# Patient Record
Sex: FEMALE | Race: BLACK OR AFRICAN AMERICAN | NOT HISPANIC OR LATINO
[De-identification: names, ages, dates, MRNs, and addresses within clinical notes are randomized per-mention and may not be internally consistent; named-entity substitution may affect disease eponyms.]

---

## 2021-06-14 ENCOUNTER — APPOINTMENT (OUTPATIENT)
Dept: ORTHOPEDIC SURGERY | Facility: CLINIC | Age: 61
End: 2021-06-14
Payer: COMMERCIAL

## 2021-06-14 VITALS
BODY MASS INDEX: 29.7 KG/M2 | OXYGEN SATURATION: 98 % | DIASTOLIC BLOOD PRESSURE: 82 MMHG | SYSTOLIC BLOOD PRESSURE: 136 MMHG | HEART RATE: 72 BPM | WEIGHT: 196 LBS | HEIGHT: 68 IN

## 2021-06-14 DIAGNOSIS — S82.009A UNSPECIFIED FRACTURE OF UNSPECIFIED PATELLA, INITIAL ENCOUNTER FOR CLOSED FRACTURE: ICD-10-CM

## 2021-06-14 PROCEDURE — 73564 X-RAY EXAM KNEE 4 OR MORE: CPT | Mod: LT

## 2021-06-14 PROCEDURE — 73560 X-RAY EXAM OF KNEE 1 OR 2: CPT | Mod: RT

## 2021-06-14 PROCEDURE — 99072 ADDL SUPL MATRL&STAF TM PHE: CPT

## 2021-06-14 PROCEDURE — 20610 DRAIN/INJ JOINT/BURSA W/O US: CPT | Mod: LT

## 2021-06-14 PROCEDURE — 99204 OFFICE O/P NEW MOD 45 MIN: CPT | Mod: 25

## 2021-06-18 NOTE — PROCEDURE
[de-identified] : Left Knee aspiration\par Discussed at length the procedure of a knee aspiration. The risks, benefits, convalescence and alternatives were reviewed. The possible side effects discussed included but were not limited to: pain, swelling, bleeding and infection. Following this discussion, the knee was prepped with betadine and under a sterile condition, an 18 gauge needle was inserted into the joint through a lateral approach just superior to the patella with the knee in an extended position. The fluid was aspirated and sent for evaluation. Upon withdrawal of the needle the site was cleaned with alcohol and a bandaid applied. The patient tolerated the aspiration well and there were no adverse effects. Post aspiration instructions included no strenuous activity for 24 hours, cryotherapy and if there are any adverse effects to contact the office.\par \par 10 cc of normal appearing synovial fluid was aspirated.

## 2021-06-18 NOTE — ADDENDUM
[FreeTextEntry1] : Documented by Frankie Borjas acting as a scribe for Dr. Regan Del Cid on 06/14/2021. All medical record entries made by the Scribe were at my, Dr. Regan Del Cid, direction and personally dictated by me on 06/14/2021 . I have reviewed the chart and agree that the record accurately reflects my personal performance of the history, physical exam, assessment and plan. I have also personally directed, reviewed, and agreed with the chart.

## 2021-06-18 NOTE — PHYSICAL EXAM
[Poor Appearance] : well-appearing [Acute Distress] : not in acute distress [Obese] : not obese [de-identified] : Left knee: \par Inspection: trace effusion or erythema.\par Wounds: midline incision well healed. \par Alignment: normal\par Palpation: no specific tenderness on palpation.\par ROM: Active (in degrees) 0-130°\par Ligamentous laxity (neg):  left medial thrust, 12° of valgus, 10° extensor lag, medial lateral instability greater than 5 mm and AP translation greater than 10 mm\par Meniscal Test: negative McMurrays, negative Sofya.\par Patellofemoral Alignment Test: Q angle-, normal.\par Muscle Test: quad strength 4+/5\par Leg examination: calf is soft and non-tender. \par \par Right knee:\par Inspection: no effusion or erythema.\par Wounds: none.\par Alignment: normal\par Palpation: Tenderness over the patella. \par ROM: Active (in degrees) 0-130\par Ligamentous laxity (neg): all ligaments appear stable, negative ant. drawer test, negative post. drawer test, stable to varus stress test, stable to valgus stress test, negative Lachman's test, negative pivot shift test, less than 5 mm of medial and lateral laxity.  \par Meniscal Test: negative McMurrays, negative Sofya.\par Patellofemoral Alignment Test: Q angle-, normal.\par Muscle Test: good quad strength.\par Leg examination: calf is soft and non-tender.  [de-identified] : Left Knee Xrays, taken at the office today show: standing AP/Lateral, Merchant films, and 45 degree PA standing view demonstrates total knee replacement in satisfactory position and alignment. No evidence of lucency. Valgus alignment, femoral is flexed with reduced posterior conoidal offset and reduce tibial slope, facture of lateral patella facet, but patella appears to be well fixed in bone. \par \par Right knee xray merchant view taken at the office today demonstrates a tilt to the patella.

## 2021-06-18 NOTE — DISCUSSION/SUMMARY
[de-identified] : Discussed at length the nature of the patients condition. Their left knee knee symptoms appear secondary to global instability with valgus malalignment and patella fracture possibly a stress fracture, there is also a slight lag with extension. Discussed at length the nature of the failed total knee replacement and reviewed non-operative and operative treatment. Due to the pain and associated disability I recommend a LEFT revision total knee replacement. The risks, benefits, convalescence and alternatives were reviewed. Numerous questions were asked and answered. Models were used as an educational tool. Surgery will be scheduled at a convenient time.We did discuss implant choice and fixation, with shared decision making with the patient.LCCK Prosthesis should be available. The knee was aspirated to ensure there is no infection. I have ordered CBC, ESR, CRP, and D-DIMER as a baseline to rule out infection. Preop medical clearance.

## 2021-06-18 NOTE — HISTORY OF PRESENT ILLNESS
[de-identified] : 61 year old F presents for an initial evaluation of left knee pain, she has seen other knee providers and presents for a second opinion. S/p BL TKA done on 06- by Dr. Bravo. Right knee doing well, left knee continues to be painful. Pain is posterior and medial. Pain ranges from sharp to dull, there is swelling and buckling, no other mechanical issues. She feels as if the knee is going to give out in a valgus type fashion. No bracing at this time. Must climb stairs one at a time, she can not ambulate for long periods, both of these events cause severe pain. Patient has cervical spine pain and has injection with RFA treatment. Takes tramadol, Gabapentin, and Meloxicam for pain. No wound issues post operatively. She has presented to [facility] for this issue as well.  [Ataxia] : no ataxia [Incontinence] : no incontinence [Loss of Dexterity] : good dexterity [Urinary Ret.] : no urinary retention

## 2021-08-18 ENCOUNTER — NON-APPOINTMENT (OUTPATIENT)
Age: 61
End: 2021-08-18

## 2021-08-21 ENCOUNTER — LABORATORY RESULT (OUTPATIENT)
Age: 61
End: 2021-08-21

## 2021-08-21 RX ORDER — FOLIC ACID 0.8 MG
1 TABLET ORAL DAILY
Refills: 0 | Status: DISCONTINUED | OUTPATIENT
Start: 2021-08-24 | End: 2021-08-25

## 2021-08-21 RX ORDER — SODIUM CHLORIDE 9 MG/ML
1000 INJECTION, SOLUTION INTRAVENOUS
Refills: 0 | Status: DISCONTINUED | OUTPATIENT
Start: 2021-08-24 | End: 2021-08-25

## 2021-08-21 RX ORDER — ENOXAPARIN SODIUM 100 MG/ML
40 INJECTION SUBCUTANEOUS EVERY 24 HOURS
Refills: 0 | Status: DISCONTINUED | OUTPATIENT
Start: 2021-08-25 | End: 2021-08-25

## 2021-08-21 RX ORDER — OXYCODONE HYDROCHLORIDE 5 MG/1
5 TABLET ORAL
Refills: 0 | Status: DISCONTINUED | OUTPATIENT
Start: 2021-08-24 | End: 2021-08-25

## 2021-08-21 RX ORDER — POLYETHYLENE GLYCOL 3350 17 G/17G
17 POWDER, FOR SOLUTION ORAL AT BEDTIME
Refills: 0 | Status: DISCONTINUED | OUTPATIENT
Start: 2021-08-24 | End: 2021-08-25

## 2021-08-21 RX ORDER — OXYCODONE HYDROCHLORIDE 5 MG/1
10 TABLET ORAL
Refills: 0 | Status: DISCONTINUED | OUTPATIENT
Start: 2021-08-24 | End: 2021-08-25

## 2021-08-21 RX ORDER — ACETAMINOPHEN 500 MG
1000 TABLET ORAL ONCE
Refills: 0 | Status: DISCONTINUED | OUTPATIENT
Start: 2021-08-24 | End: 2021-08-25

## 2021-08-21 RX ORDER — MAGNESIUM HYDROXIDE 400 MG/1
30 TABLET, CHEWABLE ORAL DAILY
Refills: 0 | Status: DISCONTINUED | OUTPATIENT
Start: 2021-08-24 | End: 2021-08-25

## 2021-08-21 RX ORDER — TRAMADOL HYDROCHLORIDE 50 MG/1
50 TABLET ORAL EVERY 6 HOURS
Refills: 0 | Status: DISCONTINUED | OUTPATIENT
Start: 2021-08-24 | End: 2021-08-25

## 2021-08-21 RX ORDER — PANTOPRAZOLE SODIUM 20 MG/1
40 TABLET, DELAYED RELEASE ORAL
Refills: 0 | Status: DISCONTINUED | OUTPATIENT
Start: 2021-08-24 | End: 2021-08-25

## 2021-08-21 RX ORDER — ACETAMINOPHEN 500 MG
975 TABLET ORAL EVERY 8 HOURS
Refills: 0 | Status: DISCONTINUED | OUTPATIENT
Start: 2021-08-24 | End: 2021-08-25

## 2021-08-21 RX ORDER — SENNA PLUS 8.6 MG/1
2 TABLET ORAL AT BEDTIME
Refills: 0 | Status: DISCONTINUED | OUTPATIENT
Start: 2021-08-24 | End: 2021-08-25

## 2021-08-21 RX ORDER — HYDROMORPHONE HYDROCHLORIDE 2 MG/ML
0.5 INJECTION INTRAMUSCULAR; INTRAVENOUS; SUBCUTANEOUS ONCE
Refills: 0 | Status: DISCONTINUED | OUTPATIENT
Start: 2021-08-24 | End: 2021-08-25

## 2021-08-21 RX ORDER — FAMOTIDINE 10 MG/ML
20 INJECTION INTRAVENOUS EVERY 12 HOURS
Refills: 0 | Status: DISCONTINUED | OUTPATIENT
Start: 2021-08-24 | End: 2021-08-24

## 2021-08-23 RX ORDER — GABAPENTIN 400 MG/1
1 CAPSULE ORAL
Qty: 0 | Refills: 0 | DISCHARGE

## 2021-08-23 NOTE — PATIENT PROFILE ADULT - BILL PAYMENT
You are Important to us!  If you had tests done today, we promise to contact you with the results in 3-7 business days.    Some results take longer for us to get.  If you have not heard from our clinic by the 8th business day, please contact us at [phone].    If the test result is normal (OK), then one of our clinical caregivers will contact you by your preferred method.    If the test result is abnormal (!!!), then the Provider who ordered the test will contact you  
no

## 2021-08-23 NOTE — H&P ADULT - NSICDXPASTSURGICALHX_GEN_ALL_CORE_FT
PAST SURGICAL HISTORY:  H/O arthroscopy of knee bilateral      H/O total knee replacement     History of cholecystectomy

## 2021-08-23 NOTE — H&P ADULT - NSHPLABSRESULTS_GEN_ALL_CORE
Left knee x-rays demonstrates TKR in satisfactory position and alignment,  No evidence of lucency. Valgus alignment femoral is flexed with reduced posterior conoidal offset and reduce tibial slope, fracture of lateral patella facet, but patella appears  to be well fixed in bone.

## 2021-08-23 NOTE — H&P ADULT - HISTORY OF PRESENT ILLNESS
61 y.o. F. S/P Bilateral TKR done on 6/8/20 by Dr. Bravo. Right knee doing well but Left knee continues to be painful with swelling, buckling secondary to global instability. Patient is here today for her Left knee Revision TKR with Dr. Del Cid.

## 2021-08-24 ENCOUNTER — RESULT REVIEW (OUTPATIENT)
Age: 61
End: 2021-08-24

## 2021-08-24 ENCOUNTER — INPATIENT (INPATIENT)
Facility: HOSPITAL | Age: 61
LOS: 0 days | Discharge: HOME HEALTH | DRG: 468 | End: 2021-08-25
Attending: ORTHOPAEDIC SURGERY | Admitting: ORTHOPAEDIC SURGERY
Payer: COMMERCIAL

## 2021-08-24 ENCOUNTER — APPOINTMENT (OUTPATIENT)
Dept: ORTHOPEDIC SURGERY | Facility: HOSPITAL | Age: 61
End: 2021-08-24

## 2021-08-24 VITALS
DIASTOLIC BLOOD PRESSURE: 80 MMHG | WEIGHT: 173.28 LBS | HEIGHT: 64 IN | HEART RATE: 75 BPM | RESPIRATION RATE: 16 BRPM | TEMPERATURE: 98 F | OXYGEN SATURATION: 97 % | SYSTOLIC BLOOD PRESSURE: 148 MMHG

## 2021-08-24 DIAGNOSIS — Z96.659 PRESENCE OF UNSPECIFIED ARTIFICIAL KNEE JOINT: Chronic | ICD-10-CM

## 2021-08-24 DIAGNOSIS — Z96.652 PRESENCE OF LEFT ARTIFICIAL KNEE JOINT: ICD-10-CM

## 2021-08-24 DIAGNOSIS — Z98.890 OTHER SPECIFIED POSTPROCEDURAL STATES: Chronic | ICD-10-CM
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
## 2021-08-24 DIAGNOSIS — Z90.49 ACQUIRED ABSENCE OF OTHER SPECIFIED PARTS OF DIGESTIVE TRACT: Chronic | ICD-10-CM

## 2021-08-24 LAB
ANION GAP SERPL CALC-SCNC: 7 MMOL/L — SIGNIFICANT CHANGE UP (ref 5–17)
APTT BLD: 31.8 SEC — SIGNIFICANT CHANGE UP (ref 27.5–35.5)
BUN SERPL-MCNC: 20 MG/DL — SIGNIFICANT CHANGE UP (ref 7–23)
CALCIUM SERPL-MCNC: 9.1 MG/DL — SIGNIFICANT CHANGE UP (ref 8.4–10.5)
CHLORIDE SERPL-SCNC: 109 MMOL/L — HIGH (ref 96–108)
CO2 SERPL-SCNC: 25 MMOL/L — SIGNIFICANT CHANGE UP (ref 22–31)
CREAT SERPL-MCNC: 0.84 MG/DL — SIGNIFICANT CHANGE UP (ref 0.5–1.3)
GLUCOSE SERPL-MCNC: 97 MG/DL — SIGNIFICANT CHANGE UP (ref 70–99)
GRAM STN FLD: SIGNIFICANT CHANGE UP
HCT VFR BLD CALC: 37.4 % — SIGNIFICANT CHANGE UP (ref 34.5–45)
HGB BLD-MCNC: 11.8 G/DL — SIGNIFICANT CHANGE UP (ref 11.5–15.5)
INR BLD: 0.9 — SIGNIFICANT CHANGE UP (ref 0.88–1.16)
MCHC RBC-ENTMCNC: 26.6 PG — LOW (ref 27–34)
MCHC RBC-ENTMCNC: 31.6 GM/DL — LOW (ref 32–36)
MCV RBC AUTO: 84.4 FL — SIGNIFICANT CHANGE UP (ref 80–100)
NRBC # BLD: 0 /100 WBCS — SIGNIFICANT CHANGE UP (ref 0–0)
PLATELET # BLD AUTO: 290 K/UL — SIGNIFICANT CHANGE UP (ref 150–400)
POTASSIUM SERPL-MCNC: 4.5 MMOL/L — SIGNIFICANT CHANGE UP (ref 3.5–5.3)
POTASSIUM SERPL-SCNC: 4.5 MMOL/L — SIGNIFICANT CHANGE UP (ref 3.5–5.3)
PROTHROM AB SERPL-ACNC: 10.9 SEC — SIGNIFICANT CHANGE UP (ref 10.6–13.6)
RBC # BLD: 4.43 M/UL — SIGNIFICANT CHANGE UP (ref 3.8–5.2)
RBC # FLD: 15.6 % — HIGH (ref 10.3–14.5)
SODIUM SERPL-SCNC: 141 MMOL/L — SIGNIFICANT CHANGE UP (ref 135–145)
SPECIMEN SOURCE: SIGNIFICANT CHANGE UP
WBC # BLD: 6.03 K/UL — SIGNIFICANT CHANGE UP (ref 3.8–10.5)
WBC # FLD AUTO: 6.03 K/UL — SIGNIFICANT CHANGE UP (ref 3.8–10.5)

## 2021-08-24 PROCEDURE — 88305 TISSUE EXAM BY PATHOLOGIST: CPT | Mod: 26

## 2021-08-24 PROCEDURE — 88311 DECALCIFY TISSUE: CPT | Mod: 26

## 2021-08-24 PROCEDURE — 73560 X-RAY EXAM OF KNEE 1 OR 2: CPT | Mod: 26,LT

## 2021-08-24 PROCEDURE — 27487 REVISE/REPLACE KNEE JOINT: CPT | Mod: LT

## 2021-08-24 PROCEDURE — 88331 PATH CONSLTJ SURG 1 BLK 1SPC: CPT | Mod: 26

## 2021-08-24 PROCEDURE — 88300 SURGICAL PATH GROSS: CPT | Mod: 26,59

## 2021-08-24 RX ORDER — GABAPENTIN 400 MG/1
600 CAPSULE ORAL THREE TIMES A DAY
Refills: 0 | Status: DISCONTINUED | OUTPATIENT
Start: 2021-08-24 | End: 2021-08-25

## 2021-08-24 RX ORDER — MELOXICAM 15 MG/1
1 TABLET ORAL
Qty: 0 | Refills: 0 | DISCHARGE

## 2021-08-24 RX ORDER — HYDROMORPHONE HYDROCHLORIDE 2 MG/ML
0.5 INJECTION INTRAMUSCULAR; INTRAVENOUS; SUBCUTANEOUS
Refills: 0 | Status: COMPLETED | OUTPATIENT
Start: 2021-08-24 | End: 2021-08-24

## 2021-08-24 RX ORDER — GABAPENTIN 400 MG/1
1 CAPSULE ORAL
Qty: 0 | Refills: 0 | DISCHARGE

## 2021-08-24 RX ORDER — TIZANIDINE 4 MG/1
2 TABLET ORAL
Qty: 0 | Refills: 0 | DISCHARGE

## 2021-08-24 RX ORDER — CEFAZOLIN SODIUM 1 G
2000 VIAL (EA) INJECTION EVERY 8 HOURS
Refills: 0 | Status: COMPLETED | OUTPATIENT
Start: 2021-08-24 | End: 2021-08-25

## 2021-08-24 RX ORDER — BUPIVACAINE 13.3 MG/ML
20 INJECTION, SUSPENSION, LIPOSOMAL INFILTRATION ONCE
Refills: 0 | Status: DISCONTINUED | OUTPATIENT
Start: 2021-08-24 | End: 2021-08-25

## 2021-08-24 RX ADMIN — Medication 975 MILLIGRAM(S): at 21:28

## 2021-08-24 RX ADMIN — OXYCODONE HYDROCHLORIDE 10 MILLIGRAM(S): 5 TABLET ORAL at 16:53

## 2021-08-24 RX ADMIN — OXYCODONE HYDROCHLORIDE 10 MILLIGRAM(S): 5 TABLET ORAL at 20:06

## 2021-08-24 RX ADMIN — SODIUM CHLORIDE 70 MILLILITER(S): 9 INJECTION, SOLUTION INTRAVENOUS at 15:54

## 2021-08-24 RX ADMIN — OXYCODONE HYDROCHLORIDE 10 MILLIGRAM(S): 5 TABLET ORAL at 23:55

## 2021-08-24 RX ADMIN — OXYCODONE HYDROCHLORIDE 10 MILLIGRAM(S): 5 TABLET ORAL at 23:01

## 2021-08-24 RX ADMIN — GABAPENTIN 600 MILLIGRAM(S): 400 CAPSULE ORAL at 22:22

## 2021-08-24 RX ADMIN — Medication 975 MILLIGRAM(S): at 22:28

## 2021-08-24 RX ADMIN — HYDROMORPHONE HYDROCHLORIDE 0.5 MILLIGRAM(S): 2 INJECTION INTRAMUSCULAR; INTRAVENOUS; SUBCUTANEOUS at 15:32

## 2021-08-24 RX ADMIN — HYDROMORPHONE HYDROCHLORIDE 0.5 MILLIGRAM(S): 2 INJECTION INTRAMUSCULAR; INTRAVENOUS; SUBCUTANEOUS at 15:47

## 2021-08-24 RX ADMIN — Medication 100 MILLIGRAM(S): at 21:29

## 2021-08-24 RX ADMIN — POLYETHYLENE GLYCOL 3350 17 GRAM(S): 17 POWDER, FOR SOLUTION ORAL at 21:28

## 2021-08-24 RX ADMIN — OXYCODONE HYDROCHLORIDE 10 MILLIGRAM(S): 5 TABLET ORAL at 15:53

## 2021-08-24 RX ADMIN — SENNA PLUS 2 TABLET(S): 8.6 TABLET ORAL at 21:28

## 2021-08-24 RX ADMIN — OXYCODONE HYDROCHLORIDE 10 MILLIGRAM(S): 5 TABLET ORAL at 21:06

## 2021-08-24 NOTE — PACU DISCHARGE NOTE - COMMENTS
Patient currently stable. Pain managed with PRN pain medication. Passed trial of void (400cc). Tolerated PO intake. Patient OOB with PT and tolerated session well. Cleared by anesthesiologist to go to  jennifer ([phone]). report given to ADELSO Bonilla

## 2021-08-24 NOTE — CONSULT NOTE ADULT - SUBJECTIVE AND OBJECTIVE BOX
HPI:  61 year old female S/P Bilateral TKR done on 6/8/20 by Dr. Martin. Right knee doing well but Left knee continues to be moderately painful with swelling, buckling secondary to global instability and unsteady gait.  Symptoms worse with stairs and after prolonged immobility. Patient is here today for her Left knee Revision TKR with Dr. Del Cid.    PAST MEDICAL & SURGICAL HISTORY:  Denies DM HBP PUD ASTHMA  allergic rhinitis  H/O arthroscopy of knee bilateral  History of cholecystectomy  H/O total knee replacement    REVIEW OF SYSTEMS    General: normal  Skin/Breast: normal  Ophthalmologic: negative  ENMT:	normal  Respiratory and Thorax: normal  Cardiovascular:	normal  Gastrointestinal:	normal  Genitourinary:	normal  Musculoskeletal:	 left knee pain and swelling   Neurological:	normal  Psychiatric:	anxiety  Hematology/Lymphatics: negative  Endocrine:	negative  Allergic/Immunologic:	negative      MEDICATIONS     tiZANidine 2 mg oral capsule 2 cap(s) orally every 8 hours  · 	traMADol 50 mg oral tablet 1 tab(s) orally every 6 hours  · 	gabapentin 600 mg oral tablet 1 tab(s) orally 3 times a day    Allergies    No Known Drug Allergies  shellfish (Unknown)    SOCIAL HISTORY: no cigs social alcohol    FAMILY HISTORY: non contributory    PHYSICAL EXAM:  Daily Height in cm: 162.56 (24 Aug 2021 10:15)      Vital Signs Last 24 Hrs  T(C): 36.7 (24 Aug 2021 10:15), Max: 36.7 (24 Aug 2021 10:15)  T(F): 98 (24 Aug 2021 10:15), Max: 98 (24 Aug 2021 10:15)  HR: 75 (24 Aug 2021 10:15) (75 - 75)  BP: 148/80 (24 Aug 2021 10:15) (148/80 - 148/80)  BP(mean): --  RR: 16 (24 Aug 2021 10:15) (16 - 16)  SpO2: 97% (24 Aug 2021 10:15) (97% - 97%)    Constitutional: WDWNF in NAD  Eyes: conj pink  ENMT: negative  Neck: supple  Breasts: not examined   Back: negative  Respiratory: clear to P&A  Cardiovascular: no MRGT or H  Gastrointestinal: normal bowel sounds  Genitourinary: neg  Rectal: not examined  Extremities: normal  Vascular: normal  Neurological: normal  Skin: negative  Lymph Nodes: negative  Musculoskeletal:   instability  left knee TKR  Psychiatric: anxiety      LABS:                        11.8   6.03  )-----------( 290      ( 24 Aug 2021 10:45 )             37.4

## 2021-08-24 NOTE — PHYSICAL THERAPY INITIAL EVALUATION ADULT - ADDITIONAL COMMENTS
Pt lives with family in a private home with 9 steps to enter. Pt reports to use DME periodically post d/c from last sx.

## 2021-08-24 NOTE — BRIEF OPERATIVE NOTE - NSICDXBRIEFPROCEDURE_GEN_ALL_CORE_FT
PROCEDURES:  Revision procedure involving femoral and tibial components of knee arthroplasty 24-Aug-2021 11:48:54  Feliz Chapa

## 2021-08-25 ENCOUNTER — TRANSCRIPTION ENCOUNTER (OUTPATIENT)
Age: 61
End: 2021-08-25

## 2021-08-25 VITALS — SYSTOLIC BLOOD PRESSURE: 130 MMHG | OXYGEN SATURATION: 97 % | HEART RATE: 87 BPM | DIASTOLIC BLOOD PRESSURE: 79 MMHG

## 2021-08-25 LAB
ANION GAP SERPL CALC-SCNC: 8 MMOL/L — SIGNIFICANT CHANGE UP (ref 5–17)
BUN SERPL-MCNC: 15 MG/DL — SIGNIFICANT CHANGE UP (ref 7–23)
CALCIUM SERPL-MCNC: 9 MG/DL — SIGNIFICANT CHANGE UP (ref 8.4–10.5)
CHLORIDE SERPL-SCNC: 106 MMOL/L — SIGNIFICANT CHANGE UP (ref 96–108)
CO2 SERPL-SCNC: 26 MMOL/L — SIGNIFICANT CHANGE UP (ref 22–31)
CREAT SERPL-MCNC: 0.92 MG/DL — SIGNIFICANT CHANGE UP (ref 0.5–1.3)
GLUCOSE SERPL-MCNC: 104 MG/DL — HIGH (ref 70–99)
HCT VFR BLD CALC: 36.1 % — SIGNIFICANT CHANGE UP (ref 34.5–45)
HGB BLD-MCNC: 11.5 G/DL — SIGNIFICANT CHANGE UP (ref 11.5–15.5)
MCHC RBC-ENTMCNC: 27.3 PG — SIGNIFICANT CHANGE UP (ref 27–34)
MCHC RBC-ENTMCNC: 31.9 GM/DL — LOW (ref 32–36)
MCV RBC AUTO: 85.5 FL — SIGNIFICANT CHANGE UP (ref 80–100)
NRBC # BLD: 0 /100 WBCS — SIGNIFICANT CHANGE UP (ref 0–0)
PLATELET # BLD AUTO: 279 K/UL — SIGNIFICANT CHANGE UP (ref 150–400)
POTASSIUM SERPL-MCNC: 4.3 MMOL/L — SIGNIFICANT CHANGE UP (ref 3.5–5.3)
POTASSIUM SERPL-SCNC: 4.3 MMOL/L — SIGNIFICANT CHANGE UP (ref 3.5–5.3)
RBC # BLD: 4.22 M/UL — SIGNIFICANT CHANGE UP (ref 3.8–5.2)
RBC # FLD: 15.9 % — HIGH (ref 10.3–14.5)
SODIUM SERPL-SCNC: 140 MMOL/L — SIGNIFICANT CHANGE UP (ref 135–145)
WBC # BLD: 9.09 K/UL — SIGNIFICANT CHANGE UP (ref 3.8–10.5)
WBC # FLD AUTO: 9.09 K/UL — SIGNIFICANT CHANGE UP (ref 3.8–10.5)

## 2021-08-25 RX ORDER — KETOROLAC TROMETHAMINE 30 MG/ML
15 SYRINGE (ML) INJECTION ONCE
Refills: 0 | Status: DISCONTINUED | OUTPATIENT
Start: 2021-08-25 | End: 2021-08-25

## 2021-08-25 RX ORDER — ASPIRIN/CALCIUM CARB/MAGNESIUM 324 MG
1 TABLET ORAL
Qty: 56 | Refills: 0
Start: 2021-08-25 | End: 2021-09-21

## 2021-08-25 RX ORDER — OXYCODONE HYDROCHLORIDE 5 MG/1
1 TABLET ORAL
Qty: 42 | Refills: 0
Start: 2021-08-25 | End: 2021-08-31

## 2021-08-25 RX ORDER — TRAMADOL HYDROCHLORIDE 50 MG/1
1 TABLET ORAL
Qty: 0 | Refills: 0 | DISCHARGE

## 2021-08-25 RX ORDER — ENOXAPARIN SODIUM 100 MG/ML
40 INJECTION SUBCUTANEOUS
Qty: 13 | Refills: 0
Start: 2021-08-25 | End: 2021-09-06

## 2021-08-25 RX ADMIN — GABAPENTIN 600 MILLIGRAM(S): 400 CAPSULE ORAL at 13:19

## 2021-08-25 RX ADMIN — OXYCODONE HYDROCHLORIDE 10 MILLIGRAM(S): 5 TABLET ORAL at 13:20

## 2021-08-25 RX ADMIN — Medication 1 MILLIGRAM(S): at 11:43

## 2021-08-25 RX ADMIN — Medication 975 MILLIGRAM(S): at 13:19

## 2021-08-25 RX ADMIN — ENOXAPARIN SODIUM 40 MILLIGRAM(S): 100 INJECTION SUBCUTANEOUS at 07:34

## 2021-08-25 RX ADMIN — Medication 975 MILLIGRAM(S): at 14:19

## 2021-08-25 RX ADMIN — Medication 975 MILLIGRAM(S): at 07:14

## 2021-08-25 RX ADMIN — Medication 1 TABLET(S): at 11:43

## 2021-08-25 RX ADMIN — Medication 15 MILLIGRAM(S): at 14:35

## 2021-08-25 RX ADMIN — TRAMADOL HYDROCHLORIDE 50 MILLIGRAM(S): 50 TABLET ORAL at 11:43

## 2021-08-25 RX ADMIN — Medication 100 MILLIGRAM(S): at 06:13

## 2021-08-25 RX ADMIN — OXYCODONE HYDROCHLORIDE 10 MILLIGRAM(S): 5 TABLET ORAL at 14:20

## 2021-08-25 RX ADMIN — GABAPENTIN 600 MILLIGRAM(S): 400 CAPSULE ORAL at 06:13

## 2021-08-25 RX ADMIN — OXYCODONE HYDROCHLORIDE 10 MILLIGRAM(S): 5 TABLET ORAL at 10:17

## 2021-08-25 RX ADMIN — Medication 15 MILLIGRAM(S): at 13:35

## 2021-08-25 RX ADMIN — OXYCODONE HYDROCHLORIDE 10 MILLIGRAM(S): 5 TABLET ORAL at 07:14

## 2021-08-25 RX ADMIN — PANTOPRAZOLE SODIUM 40 MILLIGRAM(S): 20 TABLET, DELAYED RELEASE ORAL at 06:13

## 2021-08-25 RX ADMIN — TRAMADOL HYDROCHLORIDE 50 MILLIGRAM(S): 50 TABLET ORAL at 12:43

## 2021-08-25 RX ADMIN — OXYCODONE HYDROCHLORIDE 10 MILLIGRAM(S): 5 TABLET ORAL at 06:14

## 2021-08-25 RX ADMIN — OXYCODONE HYDROCHLORIDE 10 MILLIGRAM(S): 5 TABLET ORAL at 11:17

## 2021-08-25 RX ADMIN — Medication 975 MILLIGRAM(S): at 06:13

## 2021-08-25 NOTE — DISCHARGE NOTE PROVIDER - NSDCCPTREATMENT_GEN_ALL_CORE_FT
PRINCIPAL PROCEDURE  Procedure: Revision procedure involving femoral and tibial components of knee arthroplasty  Findings and Treatment: left knee pain h/o TKA

## 2021-08-25 NOTE — DISCHARGE NOTE PROVIDER - CARE PROVIDER_API CALL
Regan Del Cid)  Orthopaedic Surgery  [address] [place] [address]  Phone: [phone]  Fax: [phone]  Follow Up Time: 2 weeks

## 2021-08-25 NOTE — DISCHARGE NOTE NURSING/CASE MANAGEMENT/SOCIAL WORK - PATIENT PORTAL LINK FT
You can access the FollowMyHealth Patient Portal offered by [facility] by registering at the following website: [email or web address]. By joining [employer]’s FollowMyHealth portal, you will also be able to view your health information using other applications (apps) compatible with our system.

## 2021-08-25 NOTE — DISCHARGE NOTE PROVIDER - NSDCMRMEDTOKEN_GEN_ALL_CORE_FT
gabapentin 600 mg oral tablet: 1 tab(s) orally 3 times a day  meloxicam 5 mg oral capsule: 1 cap(s) orally once a day  tiZANidine 2 mg oral capsule: 2 cap(s) orally every 8 hours  traMADol 50 mg oral tablet: 1 tab(s) orally every 6 hours   Aspirin Enteric Coated 81 mg oral delayed release tablet: 1 tab(s) orally 2 times a day for 4 weeks  START AFTER COMPLETION OF ENOXAPARIN INJECTIONS  enoxaparin 40 mg/0.4 mL injectable solution: 40 milligram(s) injectable once a day for 13 days  gabapentin 600 mg oral tablet: 1 tab(s) orally 3 times a day  meloxicam 5 mg oral capsule: 1 cap(s) orally once a day  oxyCODONE 5 mg oral tablet: 1-2 tab(s) orally every 4-6 hours, as needed for moderate to severe pain MDD:6  tiZANidine 2 mg oral capsule: 2 cap(s) orally every 8 hours

## 2021-08-25 NOTE — OCCUPATIONAL THERAPY INITIAL EVALUATION ADULT - ADDITIONAL COMMENTS
Pt lives w/ her family in private house w/ ~10 stairs to negotiate. Pt states that she was independent in her ADLs and functional mobility prior to admission. Pt has a commode and shower stool.

## 2021-08-25 NOTE — PROGRESS NOTE ADULT - SUBJECTIVE AND OBJECTIVE BOX
SUBJECTIVE: Pt seen and examined on morning rounds. Pt feeling well without major complaints. Pain controlled. Denies any numbness/tingling.    Vital Signs Last 24 Hrs  T(C): 36.8 (25 Aug 2021 04:47), Max: 37.1 (24 Aug 2021 17:27)  T(F): 98.3 (25 Aug 2021 04:47), Max: 98.8 (24 Aug 2021 20:21)  HR: 85 (25 Aug 2021 04:47) (68 - 92)  BP: 130/71 (25 Aug 2021 04:47) (106/64 - 148/80)  BP(mean): 104 (24 Aug 2021 17:00) (78 - 104)  RR: 15 (25 Aug 2021 04:47) (12 - 38)  SpO2: 97% (25 Aug 2021 04:47) (97% - 100%)    Physical Exam:  General: NAD, resting comfortably in bed  ACE/Aquacel dressing clean  HVx1 holding suction  Motor 5/5 Quad/Psoas/TA/GS/EHL  SILT grossly SPN/DPN/Saph/Colette/Tib    Assessment/Plan:  61yF s/p R TKA by Dr. RONNIE Del Cid on 08-24   - Weight Bearing Status: WBAT  - Pain control  - DVT PPx: LVX  - PT rec: Home PT pending clearance  -  10/60  - F/u AM labs    Jennyfer Begum, PGY-2  Orthopedic Surgery     SUBJECTIVE: Pt seen and examined on morning rounds. Pt feeling well without major complaints. Pain controlled. Denies any numbness/tingling.    Vital Signs Last 24 Hrs  T(C): 36.8 (25 Aug 2021 04:47), Max: 37.1 (24 Aug 2021 17:27)  T(F): 98.3 (25 Aug 2021 04:47), Max: 98.8 (24 Aug 2021 20:21)  HR: 85 (25 Aug 2021 04:47) (68 - 92)  BP: 130/71 (25 Aug 2021 04:47) (106/64 - 148/80)  BP(mean): 104 (24 Aug 2021 17:00) (78 - 104)  RR: 15 (25 Aug 2021 04:47) (12 - 38)  SpO2: 97% (25 Aug 2021 04:47) (97% - 100%)    Physical Exam:  General: NAD, resting comfortably in bed  ACE/Aquacel dressing clean  HVx1 holding suction  Motor 5/5 Quad/Psoas/TA/GS/EHL  SILT grossly SPN/DPN/Saph/Colette/Tib    Assessment/Plan:  61yF s/p L Rev TKA by Dr. RONNIE Del Cid on 08-24   - Weight Bearing Status: WBAT  - Pain control  - DVT PPx: LVX  - PT rec: Home PT pending clearance  -  10/60  - F/u AM labs    Jennyfer Begum, PGY-2  Orthopedic Surgery

## 2021-08-25 NOTE — DISCHARGE NOTE PROVIDER - HOSPITAL COURSE
Admitted 8/24/21  Surgery left knee revision TKA  Daisha-op Antibiotics  Pain control  DVT prophylaxis  OOB/Physical Therapy

## 2021-08-25 NOTE — PROGRESS NOTE ADULT - TIME BILLING
discussed with nursing staff house staff and Dr. Del Cid.  Recheck surgical cultures.  Plan for discharge to home with PT

## 2021-08-25 NOTE — PROGRESS NOTE ADULT - SUBJECTIVE AND OBJECTIVE BOX
pt doing well, denies n/t. ambulated w PT yesterday    LLE  drsg intact, HV w sanguinous dc  SILT DP/SP/T/S/S  +FHL/EHL/DF/PF    s/p L rev TKA  WBAT RLE  Finish ancef  DVT ppx - on Lov 40 QD  PT  DC likely today

## 2021-08-25 NOTE — DISCHARGE NOTE PROVIDER - NSDCFUADDINST_GEN_ALL_CORE_FT
No strenuous activity, heavy lifting, driving or returning to work until cleared by MD.  You may take showers. Keep the battery pack dry. You may disconnect the dressing from the battery pack prior to showers and keep away from water. To do this, hold the on/off button down until it turns off, close the clamp on the tubing, then disconnect the tubing from the battery pack. Do the reverse to turn it back on.  No soaking in bathtubs.  The dressing has a battery that usually dies in 7 days. Once this occurs, you may remove the dressing and dispose of it, then leave incision open to air. Keep incision clean and dry.  Any staples/sutures should be removed in 10-14 days.  Try to have regular bowel movements, take stool softener or laxative if necessary.  May take Aleve or Naproxen instead of Celebrex.  Swelling may travel all the way down leg to foot, this is normal and will subside in a few weeks.  Call to schedule an appt with Dr. Del Cid for follow up after discharge, usually 2-3 weeks postop.  Contact doctor if you experience: fever greater than 101.5, chills, chest pain, difficulty breathing, redness or excessive drainage around the incision, other concerns.  Follow up with primary care provider.   ***You are on blood thinners to prevent blood clots.  Administer one enoxaparin (lovenox) 40mg injection daily for fourteen days after surgery (13 more days after discharge).  After completion of the enoxaparin (lovenox) injections, start ecotrin 81mg (enteric coated aspirin therapy) two times daily for an additional 4 weeks.***  Weight bearing as tolerated with rolling walker  No strenuous activity, heavy lifting, driving or returning to work until cleared by MD.    You have a prevena dressing.  You may take showers. Keep the battery pack dry.   No soaking in bathtubs.  This dressing may be removed 5 days after surgery. To do this, hold the button down on the battery pack to turn the suction cannister off. Then peel off the dressing. Replace dressing with aquacel dressing given to you at time of discharge, and keep onuntil your follow-up with Dr. Del Cid.     Try to have regular bowel movements, take stool softener or laxative if necessary.  May take Pepcid or Zantac for upset stomach.  Swelling may travel all the way down leg to foot, this is normal and will subside in a few weeks.  Call to schedule an appt with Dr. Del Cid for follow up, if you have staples or sutures they will be removed in office.  Contact your doctor if you experience: fever greater than 101.5, chills, chest pain, difficulty breathing, redness or excessive drainage around the incision, other concerns.  Follow up with your primary care provider.

## 2021-08-25 NOTE — OCCUPATIONAL THERAPY INITIAL EVALUATION ADULT - MD ORDER
61 y.o. F. S/P Bilateral TKR done on 6/8/20. Pt's right knee is doing well but Left knee continues to be painful with swelling, buckling secondary to global instability. Patient is admitted to [facility] for her Left knee Revision TKR

## 2021-08-25 NOTE — DISCHARGE NOTE PROVIDER - NSDCCPCAREPLAN_GEN_ALL_CORE_FT
PRINCIPAL DISCHARGE DIAGNOSIS  Diagnosis: H/O total knee replacement, left  Assessment and Plan of Treatment: left knee revision TKA      SECONDARY DISCHARGE DIAGNOSES  Diagnosis: Allergic rhinitis  Assessment and Plan of Treatment:     Diagnosis: Anxiety  Assessment and Plan of Treatment:

## 2021-08-25 NOTE — PROGRESS NOTE ADULT - REASON FOR ADMISSION
Left knee painful TKR for left knee Revision TKR

## 2021-08-25 NOTE — DISCHARGE NOTE NURSING/CASE MANAGEMENT/SOCIAL WORK - NSDCPEFALRISK_GEN_ALL_CORE
For information on Fall & injury Prevention, visit [email or web address]

## 2021-08-27 LAB — SURGICAL PATHOLOGY STUDY: SIGNIFICANT CHANGE UP

## 2021-08-29 LAB
CULTURE RESULTS: NO GROWTH — SIGNIFICANT CHANGE UP
SPECIMEN SOURCE: SIGNIFICANT CHANGE UP

## 2021-08-30 DIAGNOSIS — R26.81 UNSTEADINESS ON FEET: ICD-10-CM
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
## 2021-08-30 DIAGNOSIS — Z96.653 PRESENCE OF ARTIFICIAL KNEE JOINT, BILATERAL: ICD-10-CM

## 2021-08-30 DIAGNOSIS — J30.9 ALLERGIC RHINITIS, UNSPECIFIED: ICD-10-CM

## 2021-08-30 DIAGNOSIS — T84.093A OTHER MECHANICAL COMPLICATION OF INTERNAL LEFT KNEE PROSTHESIS, INITIAL ENCOUNTER: ICD-10-CM

## 2021-08-30 DIAGNOSIS — T84.84XA PAIN DUE TO INTERNAL ORTHOPEDIC PROSTHETIC DEVICES, IMPLANTS AND GRAFTS, INITIAL ENCOUNTER: ICD-10-CM

## 2021-08-30 DIAGNOSIS — S82.002S UNSPECIFIED FRACTURE OF LEFT PATELLA, SEQUELA: ICD-10-CM

## 2021-08-30 DIAGNOSIS — M25.562 PAIN IN LEFT KNEE: ICD-10-CM
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
## 2021-08-30 DIAGNOSIS — F41.9 ANXIETY DISORDER, UNSPECIFIED: ICD-10-CM

## 2021-08-31 ENCOUNTER — NON-APPOINTMENT (OUTPATIENT)
Age: 61
End: 2021-08-31

## 2021-09-02 RX ORDER — OXYCODONE 5 MG/1
5 TABLET ORAL
Qty: 40 | Refills: 0 | Status: ACTIVE | COMMUNITY
Start: 2021-09-02 | End: 1900-01-01

## 2021-09-13 ENCOUNTER — APPOINTMENT (OUTPATIENT)
Dept: ORTHOPEDIC SURGERY | Facility: CLINIC | Age: 61
End: 2021-09-13
Payer: COMMERCIAL

## 2021-09-13 VITALS
WEIGHT: 200 LBS | HEART RATE: 62 BPM | HEIGHT: 64 IN | DIASTOLIC BLOOD PRESSURE: 82 MMHG | BODY MASS INDEX: 34.15 KG/M2 | SYSTOLIC BLOOD PRESSURE: 126 MMHG

## 2021-09-13 VITALS — HEIGHT: 68 IN | BODY MASS INDEX: 29.7 KG/M2 | WEIGHT: 196 LBS

## 2021-09-13 PROBLEM — J30.9 ALLERGIC RHINITIS, UNSPECIFIED: Chronic | Status: ACTIVE | Noted: 2021-08-24

## 2021-09-13 PROBLEM — F41.9 ANXIETY DISORDER, UNSPECIFIED: Chronic | Status: ACTIVE | Noted: 2021-08-24

## 2021-09-13 PROCEDURE — 99024 POSTOP FOLLOW-UP VISIT: CPT

## 2021-09-13 RX ORDER — OXYCODONE 5 MG/1
5 TABLET ORAL
Qty: 40 | Refills: 0 | Status: ACTIVE | COMMUNITY
Start: 2021-09-13 | End: 1900-01-01

## 2021-09-13 NOTE — HISTORY OF PRESENT ILLNESS
[de-identified] : Post-op visit [de-identified] : Patient presents today for the F/U S/P left Revision TKR done 3 weeks ago. Patient is doing well and undergoing P.T. 3 times a week with improvement. Takes Oxycodone for pain meds and Aspirin for DVT prophylaxis.  [de-identified] : ROM 0-90, straight leg raise [de-identified] : Continue P.T. pain management and DVT prophylaxis. F/U in 1 month with x-rays.\par

## 2021-09-13 NOTE — PROCEDURE
[de-identified] : Staples completely removed. Observation on incision dry, clean, intact, well healed. Method staple removing kit. Suture site Cleaned with iodine swab after sutures are completely removed. Instructions Keep incision dry and clean, allowed to shower and pat site dry, do not rub dry, contact office is site becomes red, swollen, infected, or you develop a fever. \par \par

## 2021-09-14 LAB
CULTURE RESULTS: NO GROWTH — SIGNIFICANT CHANGE UP
CULTURE RESULTS: NO GROWTH — SIGNIFICANT CHANGE UP
SPECIMEN SOURCE: SIGNIFICANT CHANGE UP
SPECIMEN SOURCE: SIGNIFICANT CHANGE UP

## 2021-09-17 PROCEDURE — 73560 X-RAY EXAM OF KNEE 1 OR 2: CPT

## 2021-09-17 PROCEDURE — 85730 THROMBOPLASTIN TIME PARTIAL: CPT

## 2021-09-17 PROCEDURE — 88331 PATH CONSLTJ SURG 1 BLK 1SPC: CPT

## 2021-09-17 PROCEDURE — 97535 SELF CARE MNGMENT TRAINING: CPT

## 2021-09-17 PROCEDURE — 87075 CULTR BACTERIA EXCEPT BLOOD: CPT

## 2021-09-17 PROCEDURE — 80048 BASIC METABOLIC PNL TOTAL CA: CPT

## 2021-09-17 PROCEDURE — 97161 PT EVAL LOW COMPLEX 20 MIN: CPT

## 2021-09-17 PROCEDURE — C1776: CPT

## 2021-09-17 PROCEDURE — 85027 COMPLETE CBC AUTOMATED: CPT

## 2021-09-17 PROCEDURE — 88311 DECALCIFY TISSUE: CPT

## 2021-09-17 PROCEDURE — 97116 GAIT TRAINING THERAPY: CPT

## 2021-09-17 PROCEDURE — 88300 SURGICAL PATH GROSS: CPT

## 2021-09-17 PROCEDURE — 36415 COLL VENOUS BLD VENIPUNCTURE: CPT

## 2021-09-17 PROCEDURE — C1713: CPT

## 2021-09-17 PROCEDURE — 88305 TISSUE EXAM BY PATHOLOGIST: CPT

## 2021-09-17 PROCEDURE — 85610 PROTHROMBIN TIME: CPT

## 2021-09-17 PROCEDURE — 87070 CULTURE OTHR SPECIMN AEROBIC: CPT

## 2021-10-04 ENCOUNTER — APPOINTMENT (OUTPATIENT)
Dept: ORTHOPEDIC SURGERY | Facility: CLINIC | Age: 61
End: 2021-10-04
Payer: COMMERCIAL

## 2021-10-04 DIAGNOSIS — Z47.1 AFTERCARE FOLLOWING JOINT REPLACEMENT SURGERY: ICD-10-CM

## 2021-10-04 DIAGNOSIS — Z96.652 AFTERCARE FOLLOWING JOINT REPLACEMENT SURGERY: ICD-10-CM

## 2021-10-04 DIAGNOSIS — Z96.659 OTHER MECHANICAL COMPLICATION OF OTHER INTERNAL JOINT PROSTHESIS, INITIAL ENCOUNTER: ICD-10-CM

## 2021-10-04 DIAGNOSIS — T84.098A OTHER MECHANICAL COMPLICATION OF OTHER INTERNAL JOINT PROSTHESIS, INITIAL ENCOUNTER: ICD-10-CM

## 2021-10-04 PROCEDURE — 99024 POSTOP FOLLOW-UP VISIT: CPT

## 2021-10-04 PROCEDURE — 73562 X-RAY EXAM OF KNEE 3: CPT | Mod: LT

## 2021-10-04 RX ORDER — TRAMADOL HYDROCHLORIDE 50 MG/1
50 TABLET, COATED ORAL
Qty: 40 | Refills: 0 | Status: ACTIVE | COMMUNITY
Start: 2021-10-04 | End: 1900-01-01

## 2021-10-13 NOTE — DISCUSSION/SUMMARY
[de-identified] : Overall, the patient is making good progress. She should continue with physical therapy. They can continue activities as tolerated. See back in 6-8 weeks.

## 2021-10-13 NOTE — PHYSICAL EXAM
[de-identified] : LEFT Knee\par Inspection: no effusion\par Wounds: healed midline incision.\par Alignment: normal.\par Palpation: no specific tenderness on palpation. Some soft tissue swelling.\par ROM: Active (in degrees): 0-120, no instability.\par Ligamentous laxity (neg): negative ant. drawer test, negative post. drawer test, stable to varus stress test, stable to valgus stress test.\par Patellofemoral Alignment Test: Q angle-, normal.\par Muscle Test: good quad strength.\par Leg examination: calf is soft and non-tender. [de-identified] : Left knee radiographs 3 views taken in the office today AP Lateral and Merchant views revision TKA in good position and alignment, chronic patella fracture with no change form prior views\par \par Right knee Merchant view radiograph taken ion the office today show TKA with patella in a central position

## 2021-10-13 NOTE — HISTORY OF PRESENT ILLNESS
[de-identified] : 61 year old female presents for 6-week follow-up evaluation of s/p left revisional TKR. The patient is doing well overall. She is currently doing physical therapy and would like a prescription to continue. She is taking Tramadol for her symptoms, especially for therapy, and would like a refill. Overall, she is happy with the progress she has made. For swelling, she should ice and elevate.\par \par

## 2021-10-13 NOTE — END OF VISIT
[FreeTextEntry3] : This note was written by Carolyn Pérez on 10/4/2021 acting as scribe for Dr. Regan Del Cid M.D.\par \par I, Dr. Regan Del Cid, have read and attest that all the information, medical decision making and discharge instructions within are true and accurate.

## 2021-10-13 NOTE — REVIEW OF SYSTEMS
[Joint Pain] : joint pain [Negative] : Heme/Lymph [Arthralgia] : no arthralgia [Joint Stiffness] : no joint stiffness

## 2021-11-08 ENCOUNTER — APPOINTMENT (OUTPATIENT)
Dept: ORTHOPEDIC SURGERY | Facility: CLINIC | Age: 61
End: 2021-11-08
Payer: COMMERCIAL

## 2021-11-08 PROCEDURE — 73562 X-RAY EXAM OF KNEE 3: CPT | Mod: LT

## 2021-11-08 PROCEDURE — 99024 POSTOP FOLLOW-UP VISIT: CPT

## 2021-11-08 NOTE — DISCUSSION/SUMMARY
[de-identified] : Patient is doing well following her left revision TKR done 3 months ago. She is making good progress with her physical therapist. We will renew her prescription for PT so that she can continue. See back in 3 months with x-ray. Refer to Dr. Malik for spinal evaluation.

## 2021-11-08 NOTE — HISTORY OF PRESENT ILLNESS
[de-identified] : 61 year old female presents for follow-up evaluation s/p left revision TKR done about 3 months ago. Overall, the patient is doing well and has been going to PT. She is taking Tramadol for her back pain, which was given to her by her spinal specialist. She would like to get a referral for a new spine specialist today from Dr. Del Cid if possible, as well as another prescription for PT.

## 2021-11-08 NOTE — REASON FOR VISIT
[Post Operative Visit] : a post operative visit for [Artificial Knee Joint] : artificial knee joint [Knee Pain] : knee pain

## 2021-11-08 NOTE — END OF VISIT
[FreeTextEntry3] : This note was written by Carolyn Pérez on 11/8/2021 acting as scribe for Dr. Regan Del Cid M.D.\par \par I, Dr. Regan Del Cid, have read and attest that all the information, medical decision making and discharge instructions within are true and accurate.

## 2021-11-08 NOTE — PHYSICAL EXAM
[de-identified] : LEFT Knee\par Inspection: no effusion or erythema. Minimal soft tissue swelling.\par Wounds: healed midline incision.\par Alignment: normal.\par Palpation: no specific tenderness on palpation.\par ROM: Active (in degrees): 0-125 degrees. \par Ligamentous laxity (neg): all ligaments appear stable, negative ant. drawer test, negative post. drawer test, stable to varus stress test, stable to valgus stress test, negative Lachman's test, negative pivot shift test.\par Meniscal test: negative Dianelys's, negative Sofya.\par Patellofemoral Alignment Test: Q-angle-, normal.\par Muscle Test: good quad strength.\par Leg examination: calf is soft and non-tender. [de-identified] : Left knee radiographs 3 views taken in the office today AP Lateral and Merchant views revision TKR in good position and alignment, chronic patella fracture with no change form prior views\par \par Right knee Merchant view radiograph taken in the office today show TKR with patella in a central position. \par

## 2022-07-19 ENCOUNTER — NON-APPOINTMENT (OUTPATIENT)
Age: 62
End: 2022-07-19

## 2022-07-20 ENCOUNTER — APPOINTMENT (OUTPATIENT)
Dept: RHEUMATOLOGY | Facility: CLINIC | Age: 62
End: 2022-07-20

## 2022-07-20 VITALS
DIASTOLIC BLOOD PRESSURE: 75 MMHG | TEMPERATURE: 98.6 F | HEART RATE: 89 BPM | SYSTOLIC BLOOD PRESSURE: 104 MMHG | BODY MASS INDEX: 26.16 KG/M2 | OXYGEN SATURATION: 99 % | WEIGHT: 157 LBS | HEIGHT: 65 IN

## 2022-07-20 DIAGNOSIS — R70.0 ELEVATED ERYTHROCYTE SEDIMENTATION RATE: ICD-10-CM

## 2022-07-20 DIAGNOSIS — R79.82 ELEVATED C-REACTIVE PROTEIN (CRP): ICD-10-CM

## 2022-07-20 DIAGNOSIS — M25.562 PAIN IN LEFT KNEE: ICD-10-CM

## 2022-07-20 PROCEDURE — 99204 OFFICE O/P NEW MOD 45 MIN: CPT | Mod: 25

## 2022-07-20 PROCEDURE — 36415 COLL VENOUS BLD VENIPUNCTURE: CPT

## 2022-07-20 NOTE — ASSESSMENT
[FreeTextEntry1] : This is a 62-year-old woman she has been referred because of elevated ESR and CRP apparently previous numbers have been normal or negative she is status post revision of left total knee replacement a year ago which more recently has become swollen and warm as well as painful she did have an aspiration by orthopedics about a week or 2 back which was apparently negative she is also had courses of antibiotics recently for sore throat both penicillin as well as Zithromax she is also been found to have a weakly positive OLGA and positive rheumatoid factor of unclear significance the etiology of the elevated ESR and CRP is unclear they could certainly have been represented are related to recent COVID infections recent sore throats or inflammatory process in the left total knee replacement the culture negative in that knee is certainly reassuring however its relationship to her 2 courses of antibiotics are unclear as that could impair culture I still feel that the etiology of the ESR and CRP is more likely related to that left knee and additional evaluations would be needed for completeness I am obtaining an ESR and CRP to compare to prior levels I again did not feel that the weakly positive OLGA and rheumatoid factor with negative CRP are necessarily related to current issues I plan on reviewing the sed rate and the CRP with her when they return they will be drawn in the office today sent to lab for processing

## 2022-07-20 NOTE — REASON FOR VISIT
[Initial Evaluation] : an initial evaluation [FreeTextEntry1] : Elevated inflammatory markers pain and swelling in left knee with warmth

## 2022-07-20 NOTE — DATA REVIEWED
[FreeTextEntry1] : Extensive labs have been reviewed these are 103 pages of records mostly laboratory studies dating back over a year most recently she has had an elevated ESR and CRP but prior sed rates and CRPs have been normal she does have a positive OLGA but subsets all appear negative she did have a positive rheumatoid factor but negative CCP antibody

## 2022-07-20 NOTE — HISTORY OF PRESENT ILLNESS
[FreeTextEntry1] : This is a a 62-year-old woman she has been referred by her primary care physician after finding an elevation in her ESR and CRP she has also been found to have a positive OLGA and positive rheumatoid factor she does have a number of comorbid conditions she has had bilateral knee replacements she has had a revision in August 2021 at [facility] have reviewed those records of her left knee done-prior to revision she had a normal ESR and CRP she did have aspiration of fluid from the prior prosthesis area which was culture negative she more recently has noted or her physical therapy initially noted that her left knee was warm and swollen she had it aspirated by her orthopedic surgeon she reports that yesterday she was told culture was negative it is somewhat improved but remains swollen and warm she also describes fatigue she is unable to eat she has been losing weight aside from the swollen warm prosthetic knee her other joints are fine she has not had any current fevers or chills she did have COVID in March 2020 she had a second episode in May 2022 she also apparently had a very sore throat it was felt to be possible strep throat although initial culture was negative she took penicillin for this which was ineffective she subsequently took a short course of prednisone and Zithromax apparently the throat got better she did get a rash on both her arms the etiology of this is unclear whether is related to strep or related to antibiotics is unclear if there is some residual dry skin on the arms but no other rash aside from feeling weak and having pain swelling and redness in her knees she said no with no other localization

## 2022-07-20 NOTE — PHYSICAL EXAM
[General Appearance - Alert] : alert [General Appearance - In No Acute Distress] : in no acute distress [General Appearance - Well Nourished] : well nourished [General Appearance - Well Developed] : well developed [Sclera] : the sclera and conjunctiva were normal [PERRL With Normal Accommodation] : pupils were equal in size, round, and reactive to light [Neck Appearance] : the appearance of the neck was normal [] : the neck was supple [Edema] : there was no peripheral edema [Cervical Lymph Nodes Enlarged Anterior Bilaterally] : anterior cervical [FreeTextEntry1] : Residual rash on both upper arms appears more scaling

## 2022-07-21 ENCOUNTER — NON-APPOINTMENT (OUTPATIENT)
Age: 62
End: 2022-07-21

## 2022-07-21 LAB
CRP SERPL-MCNC: 5 MG/L
ERYTHROCYTE [SEDIMENTATION RATE] IN BLOOD BY WESTERGREN METHOD: 57 MM/HR

## 2022-07-25 ENCOUNTER — APPOINTMENT (OUTPATIENT)
Dept: ORTHOPEDIC SURGERY | Facility: CLINIC | Age: 62
End: 2022-07-25

## 2022-09-19 ENCOUNTER — APPOINTMENT (OUTPATIENT)
Dept: ORTHOPEDIC SURGERY | Facility: CLINIC | Age: 62
End: 2022-09-19

## 2022-09-19 VITALS — WEIGHT: 10.31 LBS | HEIGHT: 65 IN | BODY MASS INDEX: 1.72 KG/M2

## 2022-09-19 DIAGNOSIS — M17.12 UNILATERAL PRIMARY OSTEOARTHRITIS, LEFT KNEE: ICD-10-CM

## 2022-09-19 DIAGNOSIS — Z96.653 PRESENCE OF ARTIFICIAL KNEE JOINT, BILATERAL: ICD-10-CM

## 2022-09-19 DIAGNOSIS — Z96.652 PRESENCE OF LEFT ARTIFICIAL KNEE JOINT: ICD-10-CM

## 2022-09-19 PROCEDURE — 73562 X-RAY EXAM OF KNEE 3: CPT | Mod: LT

## 2022-09-19 PROCEDURE — 99213 OFFICE O/P EST LOW 20 MIN: CPT

## 2022-09-19 NOTE — HISTORY OF PRESENT ILLNESS
[de-identified] : 62 year old female presents for  follow-up evaluation of s/p left revision TKR. Patient is doing well and is happy with her progress. She would like to continue PT. She is worried about her hyperextending right knee.\par

## 2022-09-19 NOTE — DISCUSSION/SUMMARY
[de-identified] : Patient is doing well following her left revision TKR. She is doing well overall. We discussed her patella fracture and there is no need for intervention at this time. She does have a leg length discrepancy and her right knee is hyperextending. I am recommending Dr. Zimmer's inserts into her right shoe. Continue Exercise program. No evidence of any infection. She will send any reports from Dr. Wyatt.

## 2022-09-19 NOTE — PHYSICAL EXAM
[de-identified] : LEFT Knee\par Inspection: no effusion\par Wounds: healed midline incision.\par Alignment: normal.\par Palpation: no specific tenderness on palpation. \par ROM: Active (in degrees): 0-130, no instability. crepitus through arc of motion\par Ligamentous laxity (neg): negative ant. drawer test, negative post. drawer test, stable to varus stress test, stable to valgus stress test.\par Patellofemoral Alignment Test: Q angle-, normal.\par Muscle Test: good quad strength.\par Leg examination: calf is soft and non-tender. [de-identified] : Left knee radiographs 3 views taken in the office today AP Lateral and Merchant views revision TKA in good position and alignment, chronic patella fracture with no change form prior views, patelloplasty with patella fragment lateral facet patella in central position chronic in nature\par \par Right knee Merchant view radiograph taken in the office today show TKA with patella in a central position \par \par CT scan report on chart 8/17/22 \par \par Labs: 7/2022 ESR 57, CRP 5 \par

## 2022-09-19 NOTE — ADDENDUM
[FreeTextEntry1] : This note was written by Vy Castañeda on 09/19/2022 acting as scribe for Dr. Regan Del Cid M.D.\par \par I, Dr. Regan Del Cid, have read and attest that all the information, medical decision making and discharge instructions within are true and accurate.

## 2022-10-07 ENCOUNTER — TRANSCRIPTION ENCOUNTER (OUTPATIENT)
Age: 62
End: 2022-10-07

## 2023-10-09 ENCOUNTER — APPOINTMENT (OUTPATIENT)
Dept: NEUROSURGERY | Facility: CLINIC | Age: 63
End: 2023-10-09
Payer: COMMERCIAL

## 2023-10-09 VITALS
SYSTOLIC BLOOD PRESSURE: 148 MMHG | WEIGHT: 145 LBS | HEART RATE: 68 BPM | OXYGEN SATURATION: 98 % | RESPIRATION RATE: 18 BRPM | BODY MASS INDEX: 24.16 KG/M2 | DIASTOLIC BLOOD PRESSURE: 90 MMHG | HEIGHT: 65 IN

## 2023-10-09 DIAGNOSIS — Z86.79 PERSONAL HISTORY OF OTHER DISEASES OF THE CIRCULATORY SYSTEM: ICD-10-CM

## 2023-10-09 DIAGNOSIS — G91.2 (IDIOPATHIC) NORMAL PRESSURE HYDROCEPHALUS: ICD-10-CM

## 2023-10-09 PROCEDURE — 99204 OFFICE O/P NEW MOD 45 MIN: CPT

## 2023-10-11 PROBLEM — G91.2 NORMAL PRESSURE HYDROCEPHALUS: Status: ACTIVE | Noted: 2023-10-11

## 2023-10-11 PROBLEM — Z86.79 HISTORY OF CARDIOMYOPATHY: Status: RESOLVED | Noted: 2023-10-11 | Resolved: 2023-10-11

## 2023-10-11 RX ORDER — METOPROLOL TARTRATE 75 MG/1
TABLET, FILM COATED ORAL
Refills: 0 | Status: ACTIVE | COMMUNITY

## 2023-10-11 RX ORDER — FUROSEMIDE 80 MG/1
TABLET ORAL
Refills: 0 | Status: ACTIVE | COMMUNITY

## 2023-10-11 RX ORDER — APIXABAN 5 MG/1
5 TABLET, FILM COATED ORAL
Refills: 0 | Status: ACTIVE | COMMUNITY

## 2023-10-11 RX ORDER — ATORVASTATIN CALCIUM 80 MG/1
TABLET, FILM COATED ORAL
Refills: 0 | Status: ACTIVE | COMMUNITY

## 2023-11-22 ENCOUNTER — APPOINTMENT (OUTPATIENT)
Dept: MRI IMAGING | Facility: HOSPITAL | Age: 63
End: 2023-11-22

## 2023-11-22 ENCOUNTER — OUTPATIENT (OUTPATIENT)
Dept: OUTPATIENT SERVICES | Facility: HOSPITAL | Age: 63
LOS: 1 days | End: 2023-11-22
Payer: COMMERCIAL

## 2023-11-22 DIAGNOSIS — Z98.890 OTHER SPECIFIED POSTPROCEDURAL STATES: Chronic | ICD-10-CM

## 2023-11-22 DIAGNOSIS — Z90.49 ACQUIRED ABSENCE OF OTHER SPECIFIED PARTS OF DIGESTIVE TRACT: Chronic | ICD-10-CM

## 2023-11-22 DIAGNOSIS — Z96.659 PRESENCE OF UNSPECIFIED ARTIFICIAL KNEE JOINT: Chronic | ICD-10-CM

## 2023-11-22 PROCEDURE — A9585: CPT

## 2023-11-22 PROCEDURE — 70553 MRI BRAIN STEM W/O & W/DYE: CPT

## 2023-11-22 PROCEDURE — 70553 MRI BRAIN STEM W/O & W/DYE: CPT | Mod: 26

## 2023-11-26 ENCOUNTER — NON-APPOINTMENT (OUTPATIENT)
Age: 63
End: 2023-11-26

## 2023-12-07 ENCOUNTER — APPOINTMENT (OUTPATIENT)
Dept: NEUROLOGY | Facility: CLINIC | Age: 63
End: 2023-12-07
Payer: COMMERCIAL

## 2023-12-07 PROCEDURE — 96132 NRPSYC TST EVAL PHYS/QHP 1ST: CPT

## 2023-12-07 PROCEDURE — 96133 NRPSYC TST EVAL PHYS/QHP EA: CPT

## 2023-12-07 PROCEDURE — 96139 PSYCL/NRPSYC TST TECH EA: CPT

## 2023-12-07 PROCEDURE — 96116 NUBHVL XM PHYS/QHP 1ST HR: CPT

## 2023-12-07 PROCEDURE — 96138 PSYCL/NRPSYC TECH 1ST: CPT

## 2023-12-27 ENCOUNTER — APPOINTMENT (OUTPATIENT)
Dept: NEUROLOGY | Facility: CLINIC | Age: 63
End: 2023-12-27
Payer: COMMERCIAL

## 2023-12-27 PROCEDURE — 96133 NRPSYC TST EVAL PHYS/QHP EA: CPT

## 2025-08-05 ENCOUNTER — TRANSCRIPTION ENCOUNTER (OUTPATIENT)
Age: 65
End: 2025-08-05

## 2025-08-06 ENCOUNTER — TRANSCRIPTION ENCOUNTER (OUTPATIENT)
Age: 65
End: 2025-08-06